# Patient Record
Sex: MALE | Race: WHITE | NOT HISPANIC OR LATINO | URBAN - NONMETROPOLITAN AREA
[De-identification: names, ages, dates, MRNs, and addresses within clinical notes are randomized per-mention and may not be internally consistent; named-entity substitution may affect disease eponyms.]

---

## 2021-01-14 ENCOUNTER — PREPPED CHART (OUTPATIENT)
Dept: URBAN - NONMETROPOLITAN AREA CLINIC 4 | Facility: CLINIC | Age: 76
End: 2021-01-14

## 2021-08-13 ENCOUNTER — IMPORTED ENCOUNTER (OUTPATIENT)
Dept: URBAN - NONMETROPOLITAN AREA CLINIC 1 | Facility: CLINIC | Age: 76
End: 2021-08-13

## 2021-08-13 PROBLEM — H25.813: Noted: 2021-08-13

## 2021-08-13 PROCEDURE — 92004 COMPRE OPH EXAM NEW PT 1/>: CPT

## 2021-08-13 NOTE — PATIENT DISCUSSION
Cataract(s)-Visually significant cataract OU .-Cataract(s) causing symptomatic impairment of visual function not correctable with a tolerable change in glasses or contact lenses lighting or non-operative means resulting in specific activity limitations and/or participation restrictions including but not limited to reading viewing television driving or meeting vocational or recreational needs. -Expectation is clearer vision and functional improvement in symptoms as well as reduced glare disability after cataract removal.-Order IOLMaster and OPD today. -Recommend Stand/Trad  based on today's OPD testing and lifestyle questionnaire.-All questions were answered regarding surgery including pre and post-op medications appointments activity restrictions and anesthetic usage.-The risks benefits and alternatives and special risk factors for the patient were discussed in detail including but not limited to: bleeding infection retinal detachment vitreous loss problems with the implant and possible need for additional surgery.-Although rare the possibility of complete vision loss was discussed.-The possible need for glasses post-operatively was discussed.-Order medical clearance exam based on history of high cholesterol and HBP -Patient elects to proceed with cataract surgery OD . Will schedule at patient's convenience and re-evaluate OS  in the future. Discussed lens and pt elects Stand/Trad OU discussed need for bifocals s/p surgery. Post op inflammation anticipated discussed dextenza insertion after surgery.

## 2021-09-20 PROBLEM — H25.813: Noted: 2021-09-20

## 2021-09-21 ENCOUNTER — IMPORTED ENCOUNTER (OUTPATIENT)
Dept: URBAN - NONMETROPOLITAN AREA CLINIC 1 | Facility: CLINIC | Age: 76
End: 2021-09-21

## 2021-10-02 ENCOUNTER — IMPORTED ENCOUNTER (OUTPATIENT)
Dept: URBAN - NONMETROPOLITAN AREA CLINIC 1 | Facility: CLINIC | Age: 76
End: 2021-10-02

## 2021-10-02 PROBLEM — Z98.41: Noted: 2021-10-02

## 2021-10-02 PROBLEM — H25.812: Noted: 2021-10-02

## 2021-10-02 PROCEDURE — 99024 POSTOP FOLLOW-UP VISIT: CPT

## 2021-10-02 NOTE — PATIENT DISCUSSION
s/p PC IOL OD Stand/Trad 10/1/2021-  discussed findings w/patient-  Pt doing well s/p PCIOL. -  Continue post-op gtts according to instruction sheet and sleep with eye shield over eye for 7 nights. -  Avoid bending at the waist lifting anything over 5lbs and dirty or darion environments.-  RTC as scheduled or prn

## 2021-10-06 NOTE — PATIENT DISCUSSION
IOP elevated today. Burped ab-externa incision in office today without complications - IOP down to 19mmHg. Immediately instilled 1gtt Besivance. Otherwise doing well. Prescribe Simbrinza BID OD until follow-up visit with Dr. Artis Lazo. Continue post-operative drops as directed. Follow-up with Dr. Artis Lazo as scheduled.

## 2021-10-07 ENCOUNTER — IMPORTED ENCOUNTER (OUTPATIENT)
Dept: URBAN - NONMETROPOLITAN AREA CLINIC 1 | Facility: CLINIC | Age: 76
End: 2021-10-07

## 2021-10-07 PROBLEM — Z98.42: Noted: 2021-10-29

## 2021-10-07 PROBLEM — Z01.818: Noted: 2021-10-07

## 2021-10-07 PROBLEM — Z98.41: Noted: 2021-10-07

## 2021-10-07 PROBLEM — H25.812: Noted: 2021-10-07

## 2021-10-07 NOTE — PATIENT DISCUSSION
Cataract(s)-Visually significant cataract OS . -Cataract(s) causing symptomatic impairment of visual function not correctable with a tolerable change in glasses or contact lenses lighting or non-operative means resulting in specific activity limitations and/or participation restrictions including but not limited to reading viewing television driving or meeting vocational or recreational needs. -Expectation is clearer vision and functional improvement in symptoms as well as reduced glare disability after cataract removal.-Recommend Standard Trad IOL  based on previous OPD testing and lifestyle questionnaire.-All questions were answered regarding surgery including pre and post-op medications appointments activity restrictions and anesthetic usage.-The risks benefits and alternatives and special risk factors for the patient were discussed in detail including but not limited to: bleeding infection retinal detachment vitreous loss problems with the implant and possible need for additional surgery.-Although rare the possibility of complete vision loss was discussed.-The need for glasses post-operatively was discussed.-Patient elects to proceed with cataract surgery OS . Will schedule at patient's convenience. -Pt wishes to proceed w Standard Trad IOL OS s/p PCIOL-Pt doing well at 1 week s/p PCIOL. -Continue post-op gtts according to instruction sheet.-Okay to resume usual activites and d/c eye shield.

## 2021-10-13 NOTE — PATIENT DISCUSSION
Patient has elected the basic package. He understands that he will need glasses after surgery for best corrected visual acuity.

## 2021-10-27 NOTE — PATIENT DISCUSSION
Doing well. Continue post op drops as per instruction sheet. Do not rub or get water in eye. Reviewed endophthalmitis precautions. All questions answered.

## 2021-10-29 ENCOUNTER — IMPORTED ENCOUNTER (OUTPATIENT)
Dept: URBAN - NONMETROPOLITAN AREA CLINIC 1 | Facility: CLINIC | Age: 76
End: 2021-10-29

## 2021-10-29 PROCEDURE — 99024 POSTOP FOLLOW-UP VISIT: CPT

## 2021-10-29 NOTE — PATIENT DISCUSSION
s/p PC IOL OS Stand/Trad 10/28/2021-  discussed findings w/patient-  Pt doing well s/p PC IOL. -  Continue post-op gtts according to instruction sheet and sleep with eye shield over eye for 7 nights. -  Avoid bending at the waist lifting anything over 5lbs and dirty or darion environments.-  RTC  1 week POV as scheduled

## 2021-11-04 ENCOUNTER — IMPORTED ENCOUNTER (OUTPATIENT)
Dept: URBAN - NONMETROPOLITAN AREA CLINIC 1 | Facility: CLINIC | Age: 76
End: 2021-11-04

## 2021-11-04 PROCEDURE — 99024 POSTOP FOLLOW-UP VISIT: CPT

## 2021-11-04 NOTE — PATIENT DISCUSSION
s/p PC IOL OS Stand/Trad 10/28/2021-  discussed findings w/patient-  Pt doing well at 1 week s/p PCIOL. -  Continue post-op gtts according to instruction sheet.-  Okay to resume usual activites and d/c eye shield. -  RTC 3 mo DFE (patient defers PORM)

## 2022-01-28 ASSESSMENT — VISUAL ACUITY
OS_SC: 20/30+2
OD_SC: 20/30+2

## 2022-01-31 ENCOUNTER — FOLLOW UP (OUTPATIENT)
Dept: URBAN - NONMETROPOLITAN AREA CLINIC 4 | Facility: CLINIC | Age: 77
End: 2022-01-31

## 2022-01-31 DIAGNOSIS — H35.372: ICD-10-CM

## 2022-01-31 DIAGNOSIS — Z96.1: ICD-10-CM

## 2022-01-31 PROCEDURE — 92134 CPTRZ OPH DX IMG PST SGM RTA: CPT

## 2022-01-31 PROCEDURE — 99214 OFFICE O/P EST MOD 30 MIN: CPT

## 2022-01-31 ASSESSMENT — VISUAL ACUITY
OS_SC: 20/25-1
OD_SC: 20/25-1

## 2022-01-31 ASSESSMENT — TONOMETRY
OS_IOP_MMHG: 17
OD_IOP_MMHG: 16

## 2022-02-08 NOTE — PATIENT DISCUSSION
Educated patient on findings and condition. Prescribe artificial tears BID-QID OU. Advised to call/RTC if si/sx persist or worsen. Monitor.

## 2022-04-09 ASSESSMENT — VISUAL ACUITY
OU_CC: 20/70
OS_CC: 20/50
OD_SC: 20/30
OD_CC: 20/30-
OS_PH: 20/30
OD_CC: 20/70
OD_SC: 20/50
OS_CC: 20/70+
OS_AM: 20/25
OS_GLARE: 20/80
OU_SC: 20/30
OS_PH: 20/25
OD_PH: 20/40
OS_PH: 20/50
OS_SC: 20/70
OD_PH: 20/40-1
OS_AM: 20/30
OU_SC: 20/30
OS_PH: 20/40-1
OS_CC: 20/60+1
OD_PAM: 20/30
OS_SC: 20/30
OD_CC: 20/25
OU_SC: 20/50
OD_CC: 20/30+2
OD_GLARE: 20/90
OS_CC: 20/50
OS_GLARE: 20/50
OD_CC: 20/60

## 2022-04-09 ASSESSMENT — TONOMETRY
OD_IOP_MMHG: 17
OD_IOP_MMHG: 17
OS_IOP_MMHG: 18
OS_IOP_MMHG: 20
OD_IOP_MMHG: 18
OS_IOP_MMHG: 18
OD_IOP_MMHG: 16
OD_IOP_MMHG: 17